# Patient Record
(demographics unavailable — no encounter records)

---

## 2025-03-04 NOTE — ASSESSMENT
[FreeTextEntry1] : 1.  BMI >60 Suspect this may be a insurance issue where her chart does not have a weight loss medication benefit If that is the case, may be limited in terms of affordability of pharmacotherapy options Could also consider bariatric surgery

## 2025-03-04 NOTE — REVIEW OF SYSTEMS
[Fever] : no fever [Chills] : no chills [Night Sweats] : no night sweats [Abdominal Pain] : no abdominal pain

## 2025-03-04 NOTE — HISTORY OF PRESENT ILLNESS
[FreeTextEntry1] : weight loss [de-identified] : Wendy is here today with concerns about her weight.  She saw weight management was supposed to be started on GLP-1 agonist.  It seems at that point there was issues with insurance coverage/authorization.  She has not started either medication.  She is interested in pursuing pharmacotherapy to help her lose weight.  Notes from weight management reviewed

## 2025-06-09 NOTE — HISTORY OF PRESENT ILLNESS
[FreeTextEntry1] : 31 yo female with hx of  AUB/dysmenorrhea: presents today for well woman exam.  Patient was last seen by me 05/2023.    Health maintenance: Lpap- Lhpv- Lmammo- Lcolonoscopy-   POb Hx: PGyn Hx: Denies cysts/fibroids/ abn pap/STI.  PMH: PSH: Fam Hx: Denies colon/ ovarian / uterine / breast CA Social Hx: No tob/ ETOH/ Drug Medications: Allergies: NKDA

## 2025-07-30 NOTE — DISCUSSION/SUMMARY
[FreeTextEntry1] : 29 yo here for follow-up/annual exam.   1)  Health maintenance: Pap + HPV Declines STI testing s/p Gardasil #1, will consider additional doses  2) AUB/dysmenorrhea:  Resolved on Lo Ogestrel Refill Rx sent, no contraindications Safe sexual practices reviewed  RTO 1 yr or prn

## 2025-07-30 NOTE — HISTORY OF PRESENT ILLNESS
[FreeTextEntry1] : 29 yo female with hx of  AUB/dysmenorrhea: presents today for well woman exam.  Symptoms controlled with Lo Ogestrel. Ran out for 6 months and symptoms returned. Started OCP again 1 mo ago.   Patient was last seen by me 05/2023 at , was seen at [place].   Health maintenance: [facility]- 05/2023 NILM  Lhpv- N/A  Lmammo- N/A  Lcolonoscopy- N/A    POb Hx: G0 PGyn Hx: Denies fibroids/ abn pap/STI. +Previous hx of ovarian cysts Received 1 dose of gardasil vaccine. SA, male, monogamous.   PMH:  AUB, Anxiety, asthma, pcos PSH: None  Fam Hx: Denies colon/ ovarian / uterine / breast CA Social Hx: No tob/ Social ETOH/ Drug - Admits to marijuana use  Medications: Sertraline 125mg, Lo Ogestrel Allergies: Fluoxetine, Welbutrin

## 2025-07-30 NOTE — HISTORY OF PRESENT ILLNESS
[FreeTextEntry1] : 29 yo female with hx of  AUB/dysmenorrhea: presents today for well woman exam.  Symptoms controlled with Lo Ogestrel. Ran out for 6 months and symptoms returned. Started OCP again 1 mo ago.   Patient was last seen by me 05/2023 at , was seen at [place].   Health maintenance: [facility]- 05/2023 NILM  Lhpv- N/A  Lmammo- N/A  Lcolonoscopy- N/A    POb Hx: G0 PGyn Hx: Denies fibroids/ abn pap/STI. +Previous hx of ovarian cysts Received 1 dose of gardasil vaccine. SA, male, monogamous.   PMH:  AUB, Anxiety, asthma, pcos PSH: None  Fam Hx: Denies colon/ ovarian / uterine / breast CA Social Hx: No tob/ Social ETOH/ Drug - Admits to marijuana use  Medications: Sertraline 125mg, Lo Ogestrel Allergies: Fluoxetine, Welbutrin

## 2025-07-30 NOTE — DISCUSSION/SUMMARY
[FreeTextEntry1] : 31 yo here for follow-up/annual exam.   1)  Health maintenance: Pap + HPV Declines STI testing s/p Gardasil #1, will consider additional doses  2) AUB/dysmenorrhea:  Resolved on Lo Ogestrel Refill Rx sent, no contraindications Safe sexual practices reviewed  RTO 1 yr or prn

## 2025-07-30 NOTE — PHYSICAL EXAM
[Appropriately responsive] : appropriately responsive [Alert] : alert [No Acute Distress] : no acute distress [No Lymphadenopathy] : no lymphadenopathy [Soft] : soft [Non-tender] : non-tender [Non-distended] : non-distended [No HSM] : No HSM [No Lesions] : no lesions [No Mass] : no mass [Oriented x3] : oriented x3 [Examination Of The Breasts] : a normal appearance [No Masses] : no breast masses were palpable [Labia Majora] : normal [Labia Minora] : normal [Normal] : normal [Uterine Adnexae] : normal [FreeTextEntry2] : Yessy Pierson